# Patient Record
Sex: MALE | Race: WHITE | NOT HISPANIC OR LATINO | URBAN - METROPOLITAN AREA
[De-identification: names, ages, dates, MRNs, and addresses within clinical notes are randomized per-mention and may not be internally consistent; named-entity substitution may affect disease eponyms.]

---

## 2019-09-27 ENCOUNTER — OFFICE (OUTPATIENT)
Dept: URBAN - METROPOLITAN AREA CLINIC 78 | Facility: CLINIC | Age: 54
End: 2019-09-27

## 2019-09-27 VITALS
SYSTOLIC BLOOD PRESSURE: 148 MMHG | DIASTOLIC BLOOD PRESSURE: 106 MMHG | HEIGHT: 74 IN | TEMPERATURE: 98.2 F | WEIGHT: 154 LBS | HEART RATE: 92 BPM

## 2019-09-27 DIAGNOSIS — R12 HEARTBURN: ICD-10-CM

## 2019-09-27 DIAGNOSIS — F10.20 ALCOHOL DEPENDENCE, UNCOMPLICATED: ICD-10-CM

## 2019-09-27 DIAGNOSIS — R11.10 VOMITING, UNSPECIFIED: ICD-10-CM

## 2019-09-27 DIAGNOSIS — F17.200 NICOTINE DEPENDENCE, UNSPECIFIED, UNCOMPLICATED: ICD-10-CM

## 2019-09-27 DIAGNOSIS — R63.4 ABNORMAL WEIGHT LOSS: ICD-10-CM

## 2019-09-27 PROCEDURE — 99244 OFF/OP CNSLTJ NEW/EST MOD 40: CPT

## 2019-09-27 RX ORDER — DEXLANSOPRAZOLE 30 MG/1
CAPSULE, DELAYED RELEASE ORAL
Qty: 30 | Refills: 3 | Status: ACTIVE
Start: 2019-09-27

## 2019-09-27 NOTE — SERVICEHPINOTES
RAYSHAWN HUSSEIN   is a   53   year old white male with h/o former tobacco smoker, alcoholism, GERD who is being seen in consultation at the request of   DANIELLA SMITH   for acute on chronic GERD symptoms going on for approx. 9 months. He states GERD is manifested mainly with heartburn, belching, and non-bloody "acid/bile" emesis that occurs 3-4x/week, and weight loss. He notes weight loss from 178 lbs in March to current 154 lbs that he attributes to eating less due to upper GI symptoms. He mentions chronic GERD that was managed on Prilosec for many years and recently switched to Pantoprazole 40 mg qd that has not provided any improvement to his symptoms. He asks for Dexilant that a family member is on that helps and he wants to try too. He also notes "sporadic" diarrhea that can occur few times per month: No blood in stool or BRBPR. No prior colonoscopy. He is due to CRC screening due to age (He wants to consider colonoscopy after EGD). Prior EGD about 10 years ago per patient with esophageal dilation due to dysphagia at that time that helped. No known cardiac or pulmonary disease. No NSAID use. Chronic smoker stopped recently few weeks ago. ETOH abuse. Denies fevers, dysphagia, abdominal pain, melena, blood in stool, rectal bleeding. BR

## 2019-10-15 ENCOUNTER — OFFICE (OUTPATIENT)
Dept: URBAN - METROPOLITAN AREA CLINIC 32 | Facility: CLINIC | Age: 54
End: 2019-10-15

## 2019-10-15 VITALS
HEART RATE: 81 BPM | SYSTOLIC BLOOD PRESSURE: 145 MMHG | DIASTOLIC BLOOD PRESSURE: 94 MMHG | HEART RATE: 60 BPM | HEART RATE: 67 BPM | HEART RATE: 61 BPM | DIASTOLIC BLOOD PRESSURE: 89 MMHG | RESPIRATION RATE: 20 BRPM | OXYGEN SATURATION: 98 % | RESPIRATION RATE: 16 BRPM | DIASTOLIC BLOOD PRESSURE: 92 MMHG | HEART RATE: 87 BPM | TEMPERATURE: 97.5 F | DIASTOLIC BLOOD PRESSURE: 96 MMHG | TEMPERATURE: 98.1 F | RESPIRATION RATE: 12 BRPM | RESPIRATION RATE: 18 BRPM | SYSTOLIC BLOOD PRESSURE: 161 MMHG | SYSTOLIC BLOOD PRESSURE: 141 MMHG | DIASTOLIC BLOOD PRESSURE: 102 MMHG | SYSTOLIC BLOOD PRESSURE: 146 MMHG | DIASTOLIC BLOOD PRESSURE: 98 MMHG | HEART RATE: 75 BPM | SYSTOLIC BLOOD PRESSURE: 158 MMHG | SYSTOLIC BLOOD PRESSURE: 154 MMHG | HEIGHT: 74 IN | WEIGHT: 154 LBS | OXYGEN SATURATION: 100 % | RESPIRATION RATE: 11 BRPM

## 2019-10-15 DIAGNOSIS — K31.89 OTHER DISEASES OF STOMACH AND DUODENUM: ICD-10-CM

## 2019-10-15 DIAGNOSIS — K29.60 OTHER GASTRITIS WITHOUT BLEEDING: ICD-10-CM

## 2019-10-15 DIAGNOSIS — R63.4 ABNORMAL WEIGHT LOSS: ICD-10-CM

## 2019-10-15 DIAGNOSIS — R11.10 VOMITING, UNSPECIFIED: ICD-10-CM

## 2019-10-15 DIAGNOSIS — R12 HEARTBURN: ICD-10-CM

## 2019-10-15 DIAGNOSIS — K21.0 GASTRO-ESOPHAGEAL REFLUX DISEASE WITH ESOPHAGITIS: ICD-10-CM

## 2019-10-15 PROBLEM — K44.9 DIAPHRAGMATIC HERNIA WITHOUT OBSTRUCTION OR GANGRENE: Status: ACTIVE | Noted: 2019-10-15

## 2019-10-15 PROBLEM — K29.70 GASTRITIS, UNSPECIFIED, WITHOUT BLEEDING: Status: ACTIVE | Noted: 2019-10-15

## 2019-10-15 LAB
GI UPPER EGD HISOLOGY - SPECM 1 JAR(S): 3: (no result)
PDF REPORT: (no result)

## 2019-10-15 PROCEDURE — 43239 EGD BIOPSY SINGLE/MULTIPLE: CPT
